# Patient Record
Sex: MALE | Race: OTHER
[De-identification: names, ages, dates, MRNs, and addresses within clinical notes are randomized per-mention and may not be internally consistent; named-entity substitution may affect disease eponyms.]

---

## 2020-02-07 ENCOUNTER — HOSPITAL ENCOUNTER (EMERGENCY)
Dept: HOSPITAL 61 - ER | Age: 45
Discharge: HOME | End: 2020-02-07
Payer: SELF-PAY

## 2020-02-07 VITALS — HEIGHT: 67 IN | BODY MASS INDEX: 22.01 KG/M2 | WEIGHT: 140.21 LBS

## 2020-02-07 VITALS — DIASTOLIC BLOOD PRESSURE: 86 MMHG | SYSTOLIC BLOOD PRESSURE: 128 MMHG

## 2020-02-07 DIAGNOSIS — F17.200: ICD-10-CM

## 2020-02-07 DIAGNOSIS — M25.511: Primary | ICD-10-CM

## 2020-02-07 PROCEDURE — 99284 EMERGENCY DEPT VISIT MOD MDM: CPT

## 2020-02-07 PROCEDURE — 73030 X-RAY EXAM OF SHOULDER: CPT

## 2020-02-07 NOTE — PHYS DOC
Past Medical History


Past Medical History:  No Pertinent History


Smoking Status:  Current Every Day Smoker


Alcohol Use:  None





Adult General


Chief Complaint


Chief Complaint:  SHOULDER INJURY





HPI


HPI





Patient is a 44  year old male who presents with is a sheet rock hitting her and

approximate 2 weeks ago he went to brace she brought up above his head and heard

a pop in his right shoulder. He states since then is been very sore is very 

limited range of motion and cannot work. States his pain a 7 out of 10.





Review of Systems


Review of Systems








Musculoskeletal: Denies back pain. Right shoulder joint pain []





All other systems were reviewed and found to be within normal limits, except as 

documented in this note.





Current Medications


Current Medications





Current Medications








 Medications


  (Trade)  Dose


 Ordered  Sig/Alexander  Start Time


 Stop Time Status Last Admin


Dose Admin


 


 Ibuprofen


  (Motrin)  600 mg  1X  ONCE  20 15:30


 20 15:31 DC 20 15:54


600 MG











Allergies


Allergies





Allergies








Coded Allergies Type Severity Reaction Last Updated Verified


 


  No Known Drug Allergies    20 No











Physical Exam


Physical Exam





Constitutional: Well developed, well nourished, no acute distress, non-toxic 

appearance. []


HENT: Normocephalic, atraumatic, bilateral external ears normal, oropharynx 

moist, no oral exudates, nose normal. []


Eyes: PERRLA, EOMI, conjunctiva normal, no discharge. [] 


Neck: Normal range of motion, no tenderness, supple, no stridor. [] 


Cardiovascular:Heart rate regular rhythm, no murmur []


Lungs & Thorax:  Bilateral breath sounds clear to auscultation []


Abdomen: Bowel sounds normal, soft, no tenderness, no masses, no pulsatile 

masses. [] 


Skin: Warm, dry, no erythema, no rash. [] 


Back: No tenderness, no CVA tenderness. [] 


Extremities: Right anterior and posterior tenderness, no cyanosis, no clubbing, 

Right shoulder ROM not intact, no edema. [] 


Neurologic: Alert and oriented X 3, normal motor function, normal sensory 

function, no focal deficits noted. []


Psychologic: Affect normal, judgement normal, mood normal. []





Current Patient Data


Vital Signs





                                   Vital Signs








  Date Time  Temp Pulse Resp B/P (MAP) Pulse Ox O2 Delivery O2 Flow Rate FiO2


 


20 13:55 98.2 81 17 128/86 (100) 100 Room Air  





 98.2       











EKG


EKG


[]





Radiology/Procedures


Radiology/Procedures


[]


Impressions:


[facility]


                    [address] [place] [address]


                                 [phone]


                                        


                                 IMAGING REPORT





                                     Signed





PATIENT: GREG BECERRAOUNT: [id number]     MRN#: [id number]


: 1975           LOCATION: ER              AGE: 44


SEX: M                    EXAM DT: 20         ACCESSION#: [id number]


STATUS: REG ER            ORD. PHYSICIAN: LIZ ORTIZ


REASON: Shoulder injury, pain, limited rom


PROCEDURE: SHOULDER 2+V RIGHT





SHOULDER 2+V RIGHT


 


History: Shoulder injury. Pain.


 


Technique: 3 views right shoulder.


 


Comparison: None.


 


Findings:


Normal alignment of the glenohumeral and acromioclavicular joint. No 


fracture. Mild right acromioclavicular DJD.


 


Impression: 


1.  No acute osseous abnormality.


 


Electronically signed by: Finesse Plascencia DO (2020 3:43 PM) [facility]














DICTATED and SIGNED BY:     FINESSE PLASCENCIA DO


DATE:     20 1543








Course & Med Decision Making


Course & Med Decision Making


Pertinent Labs and Imaging studies reviewed. (See chart for details)





Patient has very limited range of motion and cannot raise the arm fully above 

his head without pain cannot extend the arm at the elbow 90 outward without 

pain. No swelling or deformity to the shoulder. No redness or bruising. Patient 

denies any numbness or tingling. Radial pulses strong and present. Patient can 

wiggle all fingers and make a fist. Full strength in the extremity. No laxity in

 the joint. Skin pink warm and dry. Cap refill less than 3 seconds. Anterior and

 posterior shoulder tenderness. 





[]





Dragon Disclaimer


Dragon Disclaimer


This electronic medical record was generated, in whole or in part, using a voice

 recognition dictation system.





Departure


Departure


Impression:  


   Primary Impression:  


   Shoulder pain


Disposition:  01 HOME, SELF-CARE


Condition:  STABLE


Referrals:  


NO PCP (PCP)








ABI MARTINEZ MD


Patient Instructions:  Arthritis, Degenerative-Brief, Shoulder Pain





Additional Instructions:  


Follow up with orthopedics. Take Ibuprofen for pain. Rest the joint as much as 

possible.


Scripts


Ibuprofen (IBUPROFEN) 600 Mg Tablet


600 MG PO PRN Q6HRS PRN for INFLAMMATION, #20 TAB


   Prov: LIZ ORTIZ         20





Problem Qualifiers








   Primary Impression:  


   Shoulder pain


   Chronicity:  acute  Laterality:  right  Qualified Codes:  M25.511 - Pain in 

   right shoulder








LIZ ORTIZ             2020 15:55

## 2020-02-07 NOTE — RAD
SHOULDER 2+V RIGHT

 

History: Shoulder injury. Pain.

 

Technique: 3 views right shoulder.

 

Comparison: None.

 

Findings:

Normal alignment of the glenohumeral and acromioclavicular joint. No 

fracture. Mild right acromioclavicular DJD.

 

Impression: 

1.  No acute osseous abnormality.

 

Electronically signed by: Finesse Plascencia DO (2/7/2020 3:43 PM) [facility]